# Patient Record
Sex: FEMALE | URBAN - METROPOLITAN AREA
[De-identification: names, ages, dates, MRNs, and addresses within clinical notes are randomized per-mention and may not be internally consistent; named-entity substitution may affect disease eponyms.]

---

## 2023-10-03 ENCOUNTER — TELEPHONE (OUTPATIENT)
Dept: PHARMACY | Facility: HOSPITAL | Age: 43
End: 2023-10-03

## 2023-10-03 NOTE — TELEPHONE ENCOUNTER
Population Health: Outreach by Ambulatory Pharmacy Team    Payor: [employer] [place]  Reason: Adherence  Medication: Metformin 500 mg ER  Outcome: Patient Reached: Barriers Identified, Patient admits to not being consistent with Metformin due to side effects (GI upset). Patient states she takes it right at bedtime. Instructed patient to take the medication with evening meal before bedtime. Patient has appointment with PCP today (10/3/2023) and will discuss side effects     ANKUR MORROW CPhT

## 2023-10-04 NOTE — TELEPHONE ENCOUNTER
I reviewed the progress note and agree with the resident’s findings and plans as written. Case discussed with resident.    Darryl Lo, PharmD